# Patient Record
Sex: FEMALE | Race: WHITE | URBAN - METROPOLITAN AREA
[De-identification: names, ages, dates, MRNs, and addresses within clinical notes are randomized per-mention and may not be internally consistent; named-entity substitution may affect disease eponyms.]

---

## 2019-06-18 PROBLEM — E66.09 CLASS 1 OBESITY DUE TO EXCESS CALORIES IN ADULT: Status: ACTIVE | Noted: 2019-06-18

## 2019-06-18 PROBLEM — E66.811 CLASS 1 OBESITY DUE TO EXCESS CALORIES IN ADULT: Status: ACTIVE | Noted: 2019-06-18

## 2019-06-18 PROBLEM — I10 ESSENTIAL HYPERTENSION: Status: ACTIVE | Noted: 2019-06-18

## 2019-06-26 PROBLEM — M70.61 TROCHANTERIC BURSITIS OF BOTH HIPS: Status: ACTIVE | Noted: 2019-06-26

## 2019-06-26 PROBLEM — M70.62 TROCHANTERIC BURSITIS OF BOTH HIPS: Status: ACTIVE | Noted: 2019-06-26

## 2024-03-21 ENCOUNTER — OFFICE VISIT (OUTPATIENT)
Dept: FAMILY MEDICINE CLINIC | Facility: CLINIC | Age: 59
End: 2024-03-21
Payer: COMMERCIAL

## 2024-03-21 ENCOUNTER — PATIENT MESSAGE (OUTPATIENT)
Dept: FAMILY MEDICINE CLINIC | Facility: CLINIC | Age: 59
End: 2024-03-21

## 2024-03-21 VITALS
SYSTOLIC BLOOD PRESSURE: 124 MMHG | RESPIRATION RATE: 18 BRPM | OXYGEN SATURATION: 98 % | DIASTOLIC BLOOD PRESSURE: 78 MMHG | TEMPERATURE: 98 F | HEART RATE: 80 BPM | BODY MASS INDEX: 29 KG/M2 | WEIGHT: 178.63 LBS

## 2024-03-21 DIAGNOSIS — Z13.0 SCREENING FOR ENDOCRINE, METABOLIC, AND IMMUNITY DISORDER: ICD-10-CM

## 2024-03-21 DIAGNOSIS — Z23 NEED FOR VACCINATION: ICD-10-CM

## 2024-03-21 DIAGNOSIS — Z13.228 SCREENING FOR ENDOCRINE, METABOLIC, AND IMMUNITY DISORDER: ICD-10-CM

## 2024-03-21 DIAGNOSIS — I10 BENIGN ESSENTIAL HYPERTENSION: Primary | ICD-10-CM

## 2024-03-21 DIAGNOSIS — J98.01 BRONCHOSPASM: ICD-10-CM

## 2024-03-21 DIAGNOSIS — E78.2 MIXED HYPERLIPIDEMIA: ICD-10-CM

## 2024-03-21 DIAGNOSIS — E11.9 CONTROLLED TYPE 2 DIABETES MELLITUS WITHOUT COMPLICATION, WITHOUT LONG-TERM CURRENT USE OF INSULIN (HCC): ICD-10-CM

## 2024-03-21 DIAGNOSIS — Z13.29 SCREENING FOR ENDOCRINE, METABOLIC, AND IMMUNITY DISORDER: ICD-10-CM

## 2024-03-21 PROCEDURE — 99204 OFFICE O/P NEW MOD 45 MIN: CPT | Performed by: FAMILY MEDICINE

## 2024-03-21 RX ORDER — METFORMIN HYDROCHLORIDE 500 MG/1
500 TABLET, EXTENDED RELEASE ORAL
Qty: 90 TABLET | Refills: 1 | Status: SHIPPED | OUTPATIENT
Start: 2024-03-21

## 2024-03-21 RX ORDER — METFORMIN HYDROCHLORIDE 500 MG/1
500 TABLET, EXTENDED RELEASE ORAL
COMMUNITY
Start: 2024-02-26 | End: 2024-03-21

## 2024-03-21 RX ORDER — LOSARTAN POTASSIUM AND HYDROCHLOROTHIAZIDE 12.5; 5 MG/1; MG/1
1 TABLET ORAL DAILY
Qty: 90 TABLET | Refills: 1 | Status: SHIPPED | OUTPATIENT
Start: 2024-03-21

## 2024-03-21 RX ORDER — ATORVASTATIN CALCIUM 40 MG/1
40 TABLET, FILM COATED ORAL DAILY
Qty: 90 TABLET | Refills: 1 | Status: SHIPPED | OUTPATIENT
Start: 2024-03-21

## 2024-03-21 NOTE — TELEPHONE ENCOUNTER
From: Gala Savage  To: Marcella Deng  Sent: 3/21/2024 9:57 AM CDT  Subject: eye doctor information you request at [facility] today    [address] [address]  [phone]  Dr. Rian Marino

## 2024-03-21 NOTE — PROGRESS NOTES
CHIEF COMPLAINT:   Chief Complaint   Patient presents with    New Patient     Establish care     Blood Pressure         HPI:     Gala Savage is a 58 year old female presents for establishing care.    Gala is a 59 yo F here to establish care.    DM2: was dx'd I . Last A1c was 6.5 in 3/2023.  She feels well- has no polyuria and no polydipsia. Had a diabetic eye exam in 2024, cannot recall name of optometrist in [place]. She takes Metformin  mg daily. Fhx: mother, father, sisters and brother with DM2.    HTN: She has been treated for HTN since .  On Losartan-hydrochlorothiazide tolerating well. Has no HA, no palpitations, no leg swelling, no CP.     HL: Is on Atorvastatin, tolerating well without any muscle aches or pains. 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 Has a pretty healthy diet.    Bronchospasm: she uses an albuteorl inhaler when she is exposed to fumes from carpet/vianney or when she is sick. She feels chest tightness and has wheezing. No h/o asthma.              HISTORY:  Past Medical History:   Diagnosis Date    Migraines       Past Surgical History:   Procedure Laterality Date     DELIVERY ONLY          CHOLECYSTECTOMY          COLONOSCOPY N/A 08/10/2019    Procedure: COLONOSCOPY, POSSIBLE BIOPSY, POSSIBLE POLYPECTOMY 42180;  Surgeon: Jere Raman MD;  [facility]    NEEDLE BIOPSY RIGHT      right breast (benign)      Family History   Problem Relation Age of Onset    Diabetes Mother     Hypertension Mother     Hypertension Father     Diabetes Father     Heart Disease Father     Hypertension Sister     Diabetes Sister     Hypertension Sister     Diabetes Sister     Hypertension Brother     Diabetes Brother     Breast Cancer Neg     Colon Cancer Neg     Ovarian Cancer Neg       Social History:   Social History     Socioeconomic History    Marital status:    Tobacco Use    Smoking status: Never    Smokeless  tobacco: Never   Vaping Use    Vaping Use: Never used   Substance and Sexual Activity    Alcohol use: No    Drug use: No   Social History Narrative    Works in reception/customer service, but in the office.        Medications (Active prior to today's visit):  Current Outpatient Medications   Medication Sig Dispense Refill    losartan-hydroCHLOROthiazide 50-12.5 MG Oral Tab Take 1 tablet by mouth daily. 90 tablet 1    atorvastatin 40 MG Oral Tab Take 1 tablet (40 mg total) by mouth daily. 90 tablet 1    metFORMIN  MG Oral Tablet 24 Hr Take 1 tablet (500 mg total) by mouth daily with breakfast. 90 tablet 1    albuterol 108 (90 Base) MCG/ACT Inhalation Aero Soln Inhale 2 puffs into the lungs every 4 (four) hours as needed for Wheezing. 18 g 0    fluticasone propionate 50 MCG/ACT Nasal Suspension 2 sprays by Nasal route every evening. 16 g 11    aspirin 81 MG Oral Tab EC Take 1 tablet (81 mg total) by mouth daily. 90 tablet 0       Allergies:  Allergies   Allergen Reactions    Latex RASH       PSFH elements reviewed from today and agreed except as otherwise stated in HPI.  ROS:     Review of Systems   Respiratory:  Negative for cough, shortness of breath and wheezing.    Cardiovascular:  Negative for chest pain, palpitations and leg swelling.   Gastrointestinal:  Negative for abdominal pain, diarrhea, nausea and vomiting.   Endocrine: Negative for polydipsia and polyuria.   Neurological:  Negative for dizziness and headaches.         Pertinent positives and negatives noted in the the HPI.    PHYSICAL EXAM:   /78 (BP Location: Left arm, Patient Position: Sitting, Cuff Size: large)   Pulse 80   Temp 97.7 °F (36.5 °C) (Temporal)   Resp 18   Wt 178 lb 9.6 oz (81 kg)   LMP  (LMP Unknown)   SpO2 98%   BMI 28.83 kg/m²   Vital signs reviewed.Appears stated age, well groomed.  Physical Exam  Vitals reviewed.   Constitutional:       Appearance: Normal appearance.   HENT:      Head: Normocephalic.    Cardiovascular:      Rate and Rhythm: Normal rate and regular rhythm.      Pulses: Normal pulses.      Heart sounds: Normal heart sounds.   Pulmonary:      Effort: Pulmonary effort is normal.      Breath sounds: Normal breath sounds.   Abdominal:      General: Bowel sounds are normal. There is no distension.      Palpations: Abdomen is soft.      Tenderness: There is no abdominal tenderness. There is no guarding.      Comments: No HSM   Musculoskeletal:      Right lower leg: No edema.      Left lower leg: No edema.   Skin:     General: Skin is warm and dry.   Neurological:      General: No focal deficit present.      Mental Status: She is alert and oriented to person, place, and time.      Comments: Bilateral barefoot skin diabetic exam is normal, visualized feet and the appearance is normal.  Bilateral monofilament/sensation of both feet is normal.  Pulsation pedal pulse exam of both lower legs/feet is normal as well.        Psychiatric:         Behavior: Behavior normal.          LABS     No visits with results within 2 Month(s) from this visit.   Latest known visit with results is:   [facility] Lab Encounter on 02/26/2022   Component Date Value    Patient Fasting? 02/26/2022 Yes     Sodium 02/26/2022 141     Potassium 02/26/2022 3.90     Chloride 02/26/2022 103     Carbon Dioxide 02/26/2022 25.8     Blood Urea Nitrogen 02/26/2022 18.0     Creatinine 02/26/2022 0.63     BUN/CREAT Ratio 02/26/2022 29.0 (H)     Glucose 02/26/2022 142 (H)     Calcium 02/26/2022 9.4     GFR CKD-EPI 02/26/2022 101.30       REVIEWED THIS VISIT  ASSESSMENT/PLAN:   58 year old female with    1. Benign essential hypertension  - BP stable today  - due to check labs  - cont Losartan-hydrochlorothiazide  - d/w pt a healthy, low-sodium diet, regular exercise, and wt loss  - RTC in 6 months for f-u    - COMP METABOLIC PANEL [14583] [Q]  - LIPID PANEL [7600] [Q]  - TSH W REFLEX TO FREE T4 [64133][Q]  - CBC [5399] [Q]  - losartan-hydroCHLOROthiazide  50-12.5 MG Oral Tab; Take 1 tablet by mouth daily.  Dispense: 90 tablet; Refill: 1    2. Mixed hyperlipidemia  - due to check lipids and LFTs  - cont Atorvastatin daily  - d/w pt a healthy, low-fat/low-cholesterol diet, regular exercise, and wt loss  - RTC in 6 months for f-u    - COMP METABOLIC PANEL [23115] [Q]  - LIPID PANEL [7600] [Q]  - TSH W REFLEX TO FREE T4 [52226][Q]  - atorvastatin 40 MG Oral Tab; Take 1 tablet (40 mg total) by mouth daily.  Dispense: 90 tablet; Refill: 1    3. Controlled type 2 diabetes mellitus without complication, without long-term current use of insulin (HCC)  - diabetes is well controlled  - last A1c in 3/2023 : 6.5  - last microalbumin: overdue, ordered  - last diabetic foot exam: compelted today 3/21/24  - last diabetic eye exam: completed 1/2024, pt cannot recall optometrist name (signed relase form, will call back with name)  - cont meds: Metofrmin  mg QD  - discussed appropriate diabetic diet, regular exercise, and wt loss  - RTC in 3 months for f-u     - HGB A1C [496] [Q]  - COMP METABOLIC PANEL [14914] [Q]  - LIPID PANEL [7600] [Q]  - MICROALB/CREAT RATIO, RANDOM URINE [4517] [Q]  - metFORMIN  MG Oral Tablet 24 Hr; Take 1 tablet (500 mg total) by mouth daily with breakfast.  Dispense: 90 tablet; Refill: 1    4. Bronchospasm  - uses Albutrerol prn triggers     5. Need for vaccination    - INFLUENZA REFUSED [facility]    6. Screening for endocrine, metabolic, and immunity disorder    - TSH W REFLEX TO FREE T4 [06078][Q]  - CBC [6399] [Q]      Meds This Visit:  Requested Prescriptions     Signed Prescriptions Disp Refills    losartan-hydroCHLOROthiazide 50-12.5 MG Oral Tab 90 tablet 1     Sig: Take 1 tablet by mouth daily.    atorvastatin 40 MG Oral Tab 90 tablet 1     Sig: Take 1 tablet (40 mg total) by mouth daily.    metFORMIN  MG Oral Tablet 24 Hr 90 tablet 1     Sig: Take 1 tablet (500 mg total) by mouth daily with breakfast.       Health Maintenance:  Health  Maintenance   Topic Date Due    Diabetes Care Foot Exam  Never done    Diabetes Care Dilated Eye Exam  Never done    Diabetes Care: Microalb/Creat Ratio  Never done    Annual Physical  Never done    Zoster Vaccines (1 of 2) Never done    Diabetes Care A1C  07/20/2022    Diabetes Care: GFR  02/26/2023    COVID-19 Vaccine (5 - 2023-24 season) 09/01/2023    Influenza Vaccine (1) 06/30/2024 (Originally 10/1/2023)    Mammogram  11/08/2024    Pap Smear  10/15/2025    DTaP,Tdap,and Td Vaccines (2 - Td or Tdap) 04/04/2027    Colorectal Cancer Screening  08/10/2029    Annual Depression Screening  Completed    Pneumococcal Vaccine: Birth to 64yrs  Completed         Patient/Caregiver Education: There are no barriers to learning. Medical education done.   Outcome: Patient verbalizes understanding and agrees with plan. Advised to call or RTC if symptoms persist or worsen.    Problem List:     Patient Active Problem List   Diagnosis    Benign essential hypertension    Class 1 obesity due to excess calories in adult    Trochanteric bursitis of both hips    Controlled type 2 diabetes mellitus without complication, without long-term current use of insulin (HCC)    Mixed hyperlipidemia       Imaging & Referrals:  INFLUENZA REFUSED [facility]     3/21/2024  Marcella Deng MD      Patient understands plan and follow-up.  Return in about 3 months (around 6/21/2024) for annual physical overdue, Diabetes, HTN, Cholesterol.

## 2024-03-27 NOTE — TELEPHONE ENCOUNTER
Recorded of release was faxed over to Dr. Rian caldera for a DM eye exam. Phone number [phone] fax number [phone].

## 2024-03-29 DIAGNOSIS — E11.9 CONTROLLED TYPE 2 DIABETES MELLITUS WITHOUT COMPLICATION, WITHOUT LONG-TERM CURRENT USE OF INSULIN (HCC): ICD-10-CM

## 2024-03-29 DIAGNOSIS — I10 BENIGN ESSENTIAL HYPERTENSION: Primary | ICD-10-CM

## 2024-03-29 DIAGNOSIS — E78.2 MIXED HYPERLIPIDEMIA: ICD-10-CM

## 2024-03-29 LAB
ABSOLUTE BASOPHILS: 57 CELLS/UL (ref 0–200)
ABSOLUTE EOSINOPHILS: 381 CELLS/UL (ref 15–500)
ABSOLUTE LYMPHOCYTES: 3272 CELLS/UL (ref 850–3900)
ABSOLUTE MONOCYTES: 486 CELLS/UL (ref 200–950)
ABSOLUTE NEUTROPHILS: 3904 CELLS/UL (ref 1500–7800)
ALBUMIN/GLOBULIN RATIO: 1.8 (CALC) (ref 1–2.5)
ALBUMIN: 4.6 G/DL (ref 3.6–5.1)
ALKALINE PHOSPHATASE: 88 U/L (ref 37–153)
ALT: 15 U/L (ref 6–29)
AST: 12 U/L (ref 10–35)
BASOPHILS: 0.7 %
BILIRUBIN, TOTAL: 0.4 MG/DL (ref 0.2–1.2)
BUN: 18 MG/DL (ref 7–25)
CALCIUM: 9.6 MG/DL (ref 8.6–10.4)
CARBON DIOXIDE: 29 MMOL/L (ref 20–32)
CHLORIDE: 104 MMOL/L (ref 98–110)
CHOL/HDLC RATIO: 2.4 (CALC)
CHOLESTEROL, TOTAL: 109 MG/DL
CREATININE, RANDOM URINE: 349 MG/DL (ref 20–275)
CREATININE: 0.68 MG/DL (ref 0.5–1.03)
EGFR: 102 ML/MIN/1.73M2
EOSINOPHILS: 4.7 %
GLOBULIN: 2.6 G/DL (CALC) (ref 1.9–3.7)
GLUCOSE: 138 MG/DL (ref 65–99)
HDL CHOLESTEROL: 46 MG/DL
HEMATOCRIT: 41.2 % (ref 35–45)
HEMOGLOBIN A1C: 6.8 % OF TOTAL HGB
HEMOGLOBIN: 13.5 G/DL (ref 11.7–15.5)
LDL-CHOLESTEROL: 48 MG/DL (CALC)
LYMPHOCYTES: 40.4 %
MCH: 30.8 PG (ref 27–33)
MCHC: 32.8 G/DL (ref 32–36)
MCV: 94.1 FL (ref 80–100)
MICROALBUMIN/CREATININE RATIO, RANDOM URINE: 9 MG/G CREAT
MICROALBUMIN: 3 MG/DL
MONOCYTES: 6 %
MPV: 10.9 FL (ref 7.5–12.5)
NEUTROPHILS: 48.2 %
NON-HDL CHOLESTEROL: 63 MG/DL (CALC)
PLATELET COUNT: 266 THOUSAND/UL (ref 140–400)
POTASSIUM: 3.8 MMOL/L (ref 3.5–5.3)
PROTEIN, TOTAL: 7.2 G/DL (ref 6.1–8.1)
RDW: 12.3 % (ref 11–15)
RED BLOOD CELL COUNT: 4.38 MILLION/UL (ref 3.8–5.1)
SODIUM: 142 MMOL/L (ref 135–146)
TRIGLYCERIDES: 71 MG/DL
TSH W/REFLEX TO FT4: 1.55 MIU/L (ref 0.4–4.5)
WHITE BLOOD CELL COUNT: 8.1 THOUSAND/UL (ref 3.8–10.8)

## 2024-08-16 DIAGNOSIS — I10 BENIGN ESSENTIAL HYPERTENSION: ICD-10-CM

## 2024-08-16 RX ORDER — LOSARTAN POTASSIUM AND HYDROCHLOROTHIAZIDE 12.5; 5 MG/1; MG/1
1 TABLET ORAL DAILY
Qty: 90 TABLET | Refills: 0 | Status: SHIPPED | OUTPATIENT
Start: 2024-08-16

## 2024-08-16 NOTE — TELEPHONE ENCOUNTER
Refill Passed Protocol:     Pt requesting refill of   Requested Prescriptions     Pending Prescriptions Disp Refills    losartan-hydroCHLOROthiazide 50-12.5 MG Oral Tab [Pharmacy Med Name: LOSARTAN/HCTZ 50/12.5MG TABLETS] 90 tablet 0     Sig: TAKE 1 TABLET BY MOUTH DAILY      Refill was approved and sent to pharmacy:     Hypertension Medications Protocol [id number]/16/2024 08:02 AM   Protocol Details CMP or BMP in past 12 months    Last BP reading less than 140/90    In person appointment or virtual visit in the past 12 mos or appointment in next 3 mos    EGFRCR or GFRNAA > 50        Last Time Medication was Filled:  3/21/2024 90 days 1 refill    Last Office Visit with Provider: 3/21/2024    Appt. scheduled on 8/22/2024

## 2024-08-22 ENCOUNTER — OFFICE VISIT (OUTPATIENT)
Dept: FAMILY MEDICINE CLINIC | Facility: CLINIC | Age: 59
End: 2024-08-22
Payer: COMMERCIAL

## 2024-08-22 VITALS
BODY MASS INDEX: 28.55 KG/M2 | OXYGEN SATURATION: 99 % | DIASTOLIC BLOOD PRESSURE: 84 MMHG | HEIGHT: 66.18 IN | HEART RATE: 78 BPM | RESPIRATION RATE: 18 BRPM | TEMPERATURE: 98 F | SYSTOLIC BLOOD PRESSURE: 132 MMHG | WEIGHT: 177.63 LBS

## 2024-08-22 DIAGNOSIS — Z12.31 ENCOUNTER FOR SCREENING MAMMOGRAM FOR MALIGNANT NEOPLASM OF BREAST: ICD-10-CM

## 2024-08-22 DIAGNOSIS — Z13.228 SCREENING FOR ENDOCRINE, NUTRITIONAL, METABOLIC AND IMMUNITY DISORDER: ICD-10-CM

## 2024-08-22 DIAGNOSIS — I10 BENIGN ESSENTIAL HYPERTENSION: ICD-10-CM

## 2024-08-22 DIAGNOSIS — Z13.0 SCREENING FOR ENDOCRINE, NUTRITIONAL, METABOLIC AND IMMUNITY DISORDER: ICD-10-CM

## 2024-08-22 DIAGNOSIS — E78.2 MIXED HYPERLIPIDEMIA: ICD-10-CM

## 2024-08-22 DIAGNOSIS — Z13.29 SCREENING FOR ENDOCRINE, NUTRITIONAL, METABOLIC AND IMMUNITY DISORDER: ICD-10-CM

## 2024-08-22 DIAGNOSIS — E11.9 CONTROLLED TYPE 2 DIABETES MELLITUS WITHOUT COMPLICATION, WITHOUT LONG-TERM CURRENT USE OF INSULIN (HCC): ICD-10-CM

## 2024-08-22 DIAGNOSIS — Z13.21 SCREENING FOR ENDOCRINE, NUTRITIONAL, METABOLIC AND IMMUNITY DISORDER: ICD-10-CM

## 2024-08-22 DIAGNOSIS — Z00.00 ANNUAL PHYSICAL EXAM: Primary | ICD-10-CM

## 2024-08-22 PROBLEM — G43.109 MIGRAINE WITH AURA: Status: ACTIVE | Noted: 2024-08-22

## 2024-08-22 PROBLEM — N39.0 ACUTE URINARY TRACT INFECTION: Status: ACTIVE | Noted: 2024-08-22
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
## 2024-08-22 PROBLEM — J01.00 ACUTE MAXILLARY SINUSITIS: Status: ACTIVE | Noted: 2024-08-22

## 2024-08-22 PROBLEM — K80.20 GALLSTONE: Status: ACTIVE | Noted: 2024-08-22

## 2024-08-22 PROBLEM — E66.9 OBESITY WITH BODY MASS INDEX 30 OR GREATER: Status: ACTIVE | Noted: 2024-08-22

## 2024-08-22 PROBLEM — H66.90 OTITIS MEDIA: Status: ACTIVE | Noted: 2024-08-22

## 2024-08-22 PROBLEM — K76.0 STEATOSIS OF LIVER: Status: ACTIVE | Noted: 2018-04-27

## 2024-08-22 PROBLEM — J06.9 UPPER RESPIRATORY INFECTION: Status: ACTIVE | Noted: 2024-08-22

## 2024-08-22 PROCEDURE — 99396 PREV VISIT EST AGE 40-64: CPT | Performed by: FAMILY MEDICINE

## 2024-08-22 RX ORDER — BENZONATATE 200 MG/1
200 CAPSULE ORAL 3 TIMES DAILY PRN
COMMUNITY
Start: 2023-10-07 | End: 2024-08-22 | Stop reason: ALTCHOICE

## 2024-08-24 NOTE — PROGRESS NOTES
Chief Complaint   Patient presents with    Physical     Annual exam w/o pap        HPI:   Gala Savage is a 59 year old female who presents for a complete physical with gyne exam.   Patient feels well, dental visit up to date, no hearing problem.  Vaccinations : will take Shingles vaccine later    LMP: postmenopausal  Sexual activity:monogamy   Contraception: postmenopausal  Exercise: walking.  Diet:  eating/snacks at night    Wt Readings from Last 3 Encounters:   08/22/24 177 lb 9.6 oz (80.6 kg)   03/21/24 178 lb 9.6 oz (81 kg)   02/09/22 184 lb 12.8 oz (83.8 kg)      BP Readings from Last 3 Encounters:   08/22/24 132/84   03/21/24 124/78   02/09/22 157/88     No LMP recorded (lmp unknown). Patient is postmenopausal.     Annual physical:  Overall pt states she feels well.     LMP: postmenopausal  Sexually Active: monogamous  Last pap smear: 10/2020, normal pap and HPV neg (rpt in 5 yrs)  Last mammogram: 11/2023, normal  Last Colon Ca screening: colonoscopy 8/2019 (rpt in 10 yrs)  Last DEXA Scan: n/a    Exercise: walking  Diet: regular, eating/snacking at night    DM2: was dx'd in 2020. Last A1c was 6.5 in 3/2023- is due to recheck labs.  She feels well- has no polyuria and no polydipsia. Had a diabetic eye exam in 1/2024, Dr. Marino at the [facility]. She takes Metformin  mg daily. Fhx: mother, father, sisters and brother with DM2.     HTN: She has been treated for HTN since 2020.  On Losartan-hydrochlorothiazide tolerating well. Has no HA, no palpitations, no leg swelling, no CP.      HL: Is on Atorvastatin, tolerating well without any muscle aches or pains. 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 Has a pretty healthy diet.             Current Outpatient Medications   Medication Sig Dispense Refill    losartan-hydroCHLOROthiazide 50-12.5 MG Oral Tab TAKE 1 TABLET BY MOUTH DAILY 90 tablet 0    atorvastatin 40 MG Oral Tab Take 1 tablet (40 mg total) by mouth daily. 90 tablet 1     metFORMIN  MG Oral Tablet 24 Hr Take 1 tablet (500 mg total) by mouth daily with breakfast. 90 tablet 1    albuterol 108 (90 Base) MCG/ACT Inhalation Aero Soln Inhale 2 puffs into the lungs every 4 (four) hours as needed for Wheezing. 18 g 0    fluticasone propionate 50 MCG/ACT Nasal Suspension 2 sprays by Nasal route every evening. 16 g 11    aspirin 81 MG Oral Tab EC Take 1 tablet (81 mg total) by mouth daily. 90 tablet 0      Past Medical History:    Migraines      Past Surgical History:   Procedure Laterality Date     delivery only          Cholecystectomy          Colonoscopy N/A 08/10/2019    Procedure: COLONOSCOPY, POSSIBLE BIOPSY, POSSIBLE POLYPECTOMY 53757;  Surgeon: Jere Raman MD;  [facility]    Needle biopsy right  2017    right breast (benign)      Family History   Problem Relation Age of Onset    Diabetes Mother     Hypertension Mother     Hypertension Father     Diabetes Father     Heart Disease Father     Hypertension Sister     Diabetes Sister     Hypertension Sister     Diabetes Sister     Hypertension Brother     Diabetes Brother     Breast Cancer Neg     Colon Cancer Neg     Ovarian Cancer Neg       Social History:  Social History     Socioeconomic History    Marital status:    Tobacco Use    Smoking status: Never    Smokeless tobacco: Never   Vaping Use    Vaping status: Never Used   Substance and Sexual Activity    Alcohol use: No    Drug use: No   Social History Narrative    Works in reception/customer service, but in the office.       Allergies:  Allergies   Allergen Reactions    Latex RASH        REVIEW OF SYSTEMS:     Review of Systems   Constitutional:  Negative for appetite change and fatigue.   Cardiovascular:  Negative for chest pain, palpitations and leg swelling.   Gastrointestinal:  Negative for abdominal pain, constipation, diarrhea, nausea and vomiting.   Endocrine: Negative for polydipsia and polyuria.   Genitourinary:   Negative for dysuria.        EXAM:   /84 (BP Location: Left arm, Patient Position: Sitting, Cuff Size: adult)   Pulse 78   Temp 97.8 °F (36.6 °C) (Temporal)   Resp 18   Ht 5' 6.18\" (1.681 m)   Wt 177 lb 9.6 oz (80.6 kg)   LMP  (LMP Unknown)   SpO2 99%   BMI 28.51 kg/m²    GENERAL: WD/WN in no acute distress.   HEENT: PERRLA and EOMI.  OP moist no lesions.TM WNL, yoselin.Normal ears canals bilaterally.  Neck is supple, with no cervical LAD or thyroid abnormalities.  LUNGS: are clear to auscultation bilaterally, with no wheeze, rhonchi, or rales.   HEART: is RRR.  S1, S2, with no murmurs,clicks, gallops  BREAST:deferred  ABDOMEN: is soft,NBS, NT/ND with no HSM.  No rebound or guarding. No CVA tenderness, no hernias.   EXAM: deferred  RECTAL EXAM: deferred  NEURO: Cranial nerves II-XII normal,no focal abnormalities, and reflexes coordination and gait normal and symmetric.Sensation intact.  EXTREMETIES: are symmetric with no cyanosis, clubbing, or edema.  MS: Normal muscles tones, no joints abnormalities.  SKIN: Normal color, turgor, no lesions, rashes or wounds.  PSYCH: normal affect and mood.    ASSESSMENT AND PLAN:     59 year old female with     1. Annual physical exam  Routine labs ordered today, await results. Counseled pt on healthy lifestyle changes. Vaccines today: postpones Shingles . Contraception: post-menopausal .    Last pap smear: 10/2020, normal pap and HPV neg (rpt in 5 yrs)  Last mammogram: 11/2023, normal  Last Colon Ca screening: colonoscopy 8/2019 (rpt in 10 yrs)  Last DEXA Scan: n/a    - CBC With Differential With Platelet  - Comp Metabolic Panel  - Lipid Panel  - TSH W Reflex To Free T4    2. Benign essential hypertension  - BP stable today  - due to check labs  - cont Losartan-hydrochlorothiazide  - d/w pt a healthy, low-sodium diet, regular exercise, and wt loss  - RTC in 6 months for f-u    - Comp Metabolic Panel  - Lipid Panel    3. Mixed hyperlipidemia  - due to check lipids and  LFTs  - cont Atorvastatin daily  - d/w pt a healthy, low-fat/low-cholesterol diet, regular exercise, and wt loss  - RTC in 6 months for f-u    - Comp Metabolic Panel  - Lipid Panel    4. Controlled type 2 diabetes mellitus without complication, without long-term current use of insulin (HCC)  - diabetes is well controlled  - last A1c in 3/2024 : 6.8  - last microalbumin: 3/2024  - last diabetic foot exam: completed 3/21/24  - last diabetic eye exam: completed 1/2024, Dr. Marino at [facility] (no DR)  - cont meds: Metofrmin  mg QD  - discussed appropriate diabetic diet, regular exercise, and wt loss  - RTC in 6 months for f-u    - Comp Metabolic Panel  - Lipid Panel  - HGB A1C [496] [Q]    5. Screening for endocrine, nutritional, metabolic and immunity disorder    - CBC With Differential With Platelet  - Comp Metabolic Panel  - TSH W Reflex To Free T4    6. Encounter for screening mammogram for malignant neoplasm of breast    - [facility] EVELIN 2D+3D SCREENING BILAT (CPT=77067/72790); Future        Pt's was recommended low fat diet and aerobic exercise 30 minutes three times weekly.   Health maintenance.   Osteoporosis prevention addressed  Recommended whole food type diet, eliminate processed food/low sugar and low sat fat diet      The patient indicates understanding of these issues and agrees to the plan.    Return in about 6 months (around 2/22/2025) for Diabetes, HTN, Cholesterol, medication follow-up.

## 2024-09-20 DIAGNOSIS — E11.9 CONTROLLED TYPE 2 DIABETES MELLITUS WITHOUT COMPLICATION, WITHOUT LONG-TERM CURRENT USE OF INSULIN (HCC): ICD-10-CM

## 2024-09-20 RX ORDER — METFORMIN HCL 500 MG
500 TABLET, EXTENDED RELEASE 24 HR ORAL
Qty: 90 TABLET | Refills: 0 | Status: SHIPPED | OUTPATIENT
Start: 2024-09-20

## 2024-09-20 NOTE — TELEPHONE ENCOUNTER
Pt requesting refill of   Requested Prescriptions     Pending Prescriptions Disp Refills    METFORMIN  MG Oral Tablet 24 Hr [Pharmacy Med Name: METFORMIN ER 500MG 24HR TABS] 90 tablet 1     Sig: TAKE 1 TABLET(500 MG) BY MOUTH DAILY WITH BREAKFAST      Last Time Medication was Filled:  3/21/2024    Last Office Visit with Provider: 8/22/2024  Controlled type 2 diabetes mellitus without complication, without long-term current use of insulin (HCC)  - diabetes is well controlled  - last A1c in 3/2024 : 6.8  - last microalbumin: 3/2024  - last diabetic foot exam: completed 3/21/24  - last diabetic eye exam: completed 1/2024, Dr. Marino at [facility] (no DR)  - cont meds: Metofrmin  mg QD  - discussed appropriate diabetic diet, regular exercise, and wt loss  - RTC in 6 months for f-u    No future appointments.   Return in about 6 months (around 2/22/2025) for Diabetes, HTN, Cholesterol, medication follow-up.

## 2024-09-25 LAB
ABSOLUTE BASOPHILS: 57 CELLS/UL (ref 0–200)
ABSOLUTE EOSINOPHILS: 291 CELLS/UL (ref 15–500)
ABSOLUTE LYMPHOCYTES: 2705 CELLS/UL (ref 850–3900)
ABSOLUTE MONOCYTES: 419 CELLS/UL (ref 200–950)
ABSOLUTE NEUTROPHILS: 3628 CELLS/UL (ref 1500–7800)
ALBUMIN/GLOBULIN RATIO: 1.7 (CALC) (ref 1–2.5)
ALBUMIN: 4.5 G/DL (ref 3.6–5.1)
ALKALINE PHOSPHATASE: 93 U/L (ref 37–153)
ALT: 16 U/L (ref 6–29)
AST: 12 U/L (ref 10–35)
BASOPHILS: 0.8 %
BILIRUBIN, TOTAL: 0.7 MG/DL (ref 0.2–1.2)
BUN: 21 MG/DL (ref 7–25)
CALCIUM: 9.5 MG/DL (ref 8.6–10.4)
CARBON DIOXIDE: 26 MMOL/L (ref 20–32)
CHLORIDE: 105 MMOL/L (ref 98–110)
CHOL/HDLC RATIO: 2.7 (CALC)
CHOLESTEROL, TOTAL: 103 MG/DL
CREATININE: 0.71 MG/DL (ref 0.5–1.03)
EGFR: 98 ML/MIN/1.73M2
EOSINOPHILS: 4.1 %
GLOBULIN: 2.7 G/DL (CALC) (ref 1.9–3.7)
GLUCOSE: 125 MG/DL (ref 65–99)
HDL CHOLESTEROL: 38 MG/DL
HEMATOCRIT: 42.2 % (ref 35–45)
HEMOGLOBIN A1C: 6.9 % OF TOTAL HGB
HEMOGLOBIN: 13.7 G/DL (ref 11.7–15.5)
LDL-CHOLESTEROL: 45 MG/DL (CALC)
LYMPHOCYTES: 38.1 %
MCH: 31.4 PG (ref 27–33)
MCHC: 32.5 G/DL (ref 32–36)
MCV: 96.6 FL (ref 80–100)
MONOCYTES: 5.9 %
MPV: 10.4 FL (ref 7.5–12.5)
NEUTROPHILS: 51.1 %
NON-HDL CHOLESTEROL: 65 MG/DL (CALC)
PLATELET COUNT: 273 THOUSAND/UL (ref 140–400)
POTASSIUM: 4.3 MMOL/L (ref 3.5–5.3)
PROTEIN, TOTAL: 7.2 G/DL (ref 6.1–8.1)
RDW: 12.7 % (ref 11–15)
RED BLOOD CELL COUNT: 4.37 MILLION/UL (ref 3.8–5.1)
SODIUM: 142 MMOL/L (ref 135–146)
TRIGLYCERIDES: 118 MG/DL
TSH W/REFLEX TO FT4: 1.76 MIU/L (ref 0.4–4.5)
WHITE BLOOD CELL COUNT: 7.1 THOUSAND/UL (ref 3.8–10.8)

## 2024-09-27 DIAGNOSIS — E78.2 MIXED HYPERLIPIDEMIA: ICD-10-CM

## 2024-09-27 DIAGNOSIS — I10 BENIGN ESSENTIAL HYPERTENSION: ICD-10-CM

## 2024-09-27 DIAGNOSIS — E11.9 CONTROLLED TYPE 2 DIABETES MELLITUS WITHOUT COMPLICATION, WITHOUT LONG-TERM CURRENT USE OF INSULIN (HCC): Primary | ICD-10-CM

## 2024-10-01 PROBLEM — J06.9 UPPER RESPIRATORY INFECTION: Status: RESOLVED | Noted: 2024-08-22 | Resolved: 2024-10-01

## 2024-10-17 ENCOUNTER — OFFICE VISIT (OUTPATIENT)
Dept: FAMILY MEDICINE CLINIC | Facility: CLINIC | Age: 59
End: 2024-10-17
Payer: COMMERCIAL

## 2024-10-17 VITALS
HEART RATE: 80 BPM | SYSTOLIC BLOOD PRESSURE: 122 MMHG | RESPIRATION RATE: 18 BRPM | OXYGEN SATURATION: 97 % | DIASTOLIC BLOOD PRESSURE: 82 MMHG | BODY MASS INDEX: 29 KG/M2 | WEIGHT: 178 LBS | TEMPERATURE: 97 F

## 2024-10-17 DIAGNOSIS — R10.30 LOWER ABDOMINAL PAIN: Primary | ICD-10-CM

## 2024-10-17 DIAGNOSIS — Z23 NEED FOR VACCINATION: ICD-10-CM

## 2024-10-17 PROBLEM — J01.00 ACUTE MAXILLARY SINUSITIS: Status: RESOLVED | Noted: 2024-08-22 | Resolved: 2024-10-17

## 2024-10-17 PROBLEM — H66.90 OTITIS MEDIA: Status: RESOLVED | Noted: 2024-08-22 | Resolved: 2024-10-17

## 2024-10-17 PROBLEM — K80.20 GALLSTONE: Status: RESOLVED | Noted: 2024-08-22 | Resolved: 2024-10-17
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
## 2024-10-17 PROBLEM — N39.0 ACUTE URINARY TRACT INFECTION: Status: RESOLVED | Noted: 2024-08-22 | Resolved: 2024-10-17

## 2024-10-17 LAB
APPEARANCE: CLEAR
BILIRUBIN: NEGATIVE
GLUCOSE (URINE DIPSTICK): NEGATIVE MG/DL
KETONES (URINE DIPSTICK): NEGATIVE MG/DL
LEUKOCYTES: NEGATIVE
MULTISTIX LOT#: NORMAL NUMERIC
NITRITE, URINE: NEGATIVE
OCCULT BLOOD: NEGATIVE
PH, URINE: 5.5 (ref 4.5–8)
SPECIFIC GRAVITY: >1.03 (ref 1–1.03)
UROBILINOGEN,SEMI-QN: 0.2 MG/DL (ref 0–1.9)

## 2024-10-17 PROCEDURE — 99214 OFFICE O/P EST MOD 30 MIN: CPT | Performed by: FAMILY MEDICINE

## 2024-10-17 PROCEDURE — 81003 URINALYSIS AUTO W/O SCOPE: CPT | Performed by: FAMILY MEDICINE

## 2024-10-17 NOTE — PROGRESS NOTES
CHIEF COMPLAINT:   Chief Complaint   Patient presents with    Abdominal Pain     Bloating/ pain / blood in stool          HPI:     Gala Savage is a 59 year old female presents for abdominal pain.    Abdominal pain:  pt has a 1-1.5 wk history of lower abdominal pain.  She states her lower abdomen felt \"hard\" when she pushed on it and felt a \"vibration\" and \"movement\" inside.  She would strain to have a BM.  When this happened in the past, she would drink water and eat a soft food diet and she would get better. She did the same this time and her abdomen is improved, is now soft again.  She has a h/o presumed diverticulitis per her previous PCP in [place] (never had imaging).  She reports eating popcorn recently.  She is s/p cholecystectomy.  She has no sx of dysuria, no urinary frequency, or pain with urination. No F/C and has some nausea, but not vomiting.               HISTORY:  Past Medical History:    Migraines      Past Surgical History:   Procedure Laterality Date     delivery only          Cholecystectomy          Colonoscopy N/A 08/10/2019    Procedure: COLONOSCOPY, POSSIBLE BIOPSY, POSSIBLE POLYPECTOMY 79716;  Surgeon: Jere Raman MD;  [place] [facility]    Needle biopsy right  2017    right breast (benign)      Family History   Problem Relation Age of Onset    Diabetes Mother     Hypertension Mother     Hypertension Father     Diabetes Father     Heart Disease Father     Hypertension Sister     Diabetes Sister     Hypertension Sister     Diabetes Sister     Hypertension Brother     Diabetes Brother     Breast Cancer Neg     Colon Cancer Neg     Ovarian Cancer Neg       Social History:   Social History     Socioeconomic History    Marital status:    Tobacco Use    Smoking status: Never    Smokeless tobacco: Never   Vaping Use    Vaping status: Never Used   Substance and Sexual Activity    Alcohol use: No    Drug use: No   Social History Narrative    Works in  reception/customer service, but in the office.        Medications (Active prior to today's visit):  Current Outpatient Medications   Medication Sig Dispense Refill    metFORMIN  MG Oral Tablet 24 Hr TAKE 1 TABLET(500 MG) BY MOUTH DAILY WITH BREAKFAST 90 tablet 0    losartan-hydroCHLOROthiazide 50-12.5 MG Oral Tab TAKE 1 TABLET BY MOUTH DAILY 90 tablet 0    atorvastatin 40 MG Oral Tab Take 1 tablet (40 mg total) by mouth daily. 90 tablet 1    albuterol 108 (90 Base) MCG/ACT Inhalation Aero Soln Inhale 2 puffs into the lungs every 4 (four) hours as needed for Wheezing. 18 g 0    fluticasone propionate 50 MCG/ACT Nasal Suspension 2 sprays by Nasal route every evening. 16 g 11    aspirin 81 MG Oral Tab EC Take 1 tablet (81 mg total) by mouth daily. 90 tablet 0       Allergies:  Allergies[1]    PSFH elements reviewed from today and agreed except as otherwise stated in HPI.  ROS:     Review of Systems   Constitutional:  Negative for appetite change, chills, fatigue, fever and unexpected weight change.   Gastrointestinal:  Positive for abdominal pain, constipation and nausea. Negative for abdominal distention, diarrhea and vomiting.   Genitourinary:  Negative for difficulty urinating, dysuria, flank pain, frequency and hematuria.         Pertinent positives and negatives noted in the the HPI.    PHYSICAL EXAM:   /82 (BP Location: Left arm, Patient Position: Sitting, Cuff Size: adult)   Pulse 80   Temp 97.3 °F (36.3 °C) (Temporal)   Resp 18   Wt 178 lb (80.7 kg)   LMP  (LMP Unknown)   SpO2 97%   BMI 28.57 kg/m²   Vital signs reviewed.Appears stated age, well groomed.  Physical Exam  Vitals reviewed.   Constitutional:       Appearance: Normal appearance.   HENT:      Head: Normocephalic.   Cardiovascular:      Rate and Rhythm: Normal rate and regular rhythm.      Pulses: Normal pulses.      Heart sounds: Normal heart sounds.   Pulmonary:      Effort: Pulmonary effort is normal.      Breath sounds: Normal  breath sounds.   Abdominal:      General: Bowel sounds are normal. There is no distension.      Palpations: Abdomen is soft. There is no mass.      Tenderness: There is no abdominal tenderness. There is no guarding.      Comments: No HSM   Musculoskeletal:      Right lower leg: No edema.      Left lower leg: No edema.   Skin:     General: Skin is warm and dry.   Neurological:      Mental Status: She is alert and oriented to person, place, and time.   Psychiatric:         Behavior: Behavior normal.          LABS     Office Visit on 10/17/2024   Component Date Value    Glucose Urine 10/17/2024 Negative     Bilirubin Urine 10/17/2024 Negative     Ketones, UA 10/17/2024 Negative     Spec Gravity 10/17/2024 >1.030     Blood Urine 10/17/2024 Negative     PH Urine 10/17/2024 5.5     Protein Urine 10/17/2024 Trace     Urobilinogen Urine 10/17/2024 0.2     Nitrite Urine 10/17/2024 Negative     Leukocyte Esterase Urine 10/17/2024 Negative     APPEARANCE 10/17/2024 clear     Color Urine 10/17/2024 dark yellow     Multistix Lot# 10/17/2024 [id number]     Multistix Expiration Date 10/17/2024 05/31/2025    Office Visit on 08/22/2024   Component Date Value    WHITE BLOOD CELL COUNT 09/24/2024 7.1     RED BLOOD CELL COUNT 09/24/2024 4.37     HEMOGLOBIN 09/24/2024 13.7     HEMATOCRIT 09/24/2024 42.2     MCV 09/24/2024 96.6     MCH 09/24/2024 31.4     MCHC 09/24/2024 32.5     RDW 09/24/2024 12.7     PLATELET COUNT 09/24/2024 273     MPV 09/24/2024 10.4     ABSOLUTE NEUTROPHILS 09/24/2024 3,628     ABSOLUTE LYMPHOCYTES 09/24/2024 2,705     ABSOLUTE MONOCYTES 09/24/2024 419     ABSOLUTE EOSINOPHILS 09/24/2024 291     ABSOLUTE BASOPHILS 09/24/2024 57     NEUTROPHILS 09/24/2024 51.1     LYMPHOCYTES 09/24/2024 38.1     MONOCYTES 09/24/2024 5.9     EOSINOPHILS 09/24/2024 4.1     BASOPHILS 09/24/2024 0.8     GLUCOSE 09/24/2024 125 (H)     UREA NITROGEN (BUN) 09/24/2024 21     CREATININE 09/24/2024 0.71     EGFR 09/24/2024 98     BUN/CREATININE  RATIO 09/24/2024 SEE NOTE:     SODIUM 09/24/2024 142     POTASSIUM 09/24/2024 4.3     CHLORIDE 09/24/2024 105     CARBON DIOXIDE 09/24/2024 26     CALCIUM 09/24/2024 9.5     PROTEIN, TOTAL 09/24/2024 7.2     ALBUMIN 09/24/2024 4.5     GLOBULIN 09/24/2024 2.7     ALBUMIN/GLOBULIN RATIO 09/24/2024 1.7     BILIRUBIN, TOTAL 09/24/2024 0.7     ALKALINE PHOSPHATASE 09/24/2024 93     AST 09/24/2024 12     ALT 09/24/2024 16     CHOLESTEROL, TOTAL 09/24/2024 103     HDL CHOLESTEROL 09/24/2024 38 (L)     TRIGLYCERIDES 09/24/2024 118     LDL-CHOLESTEROL 09/24/2024 45     CHOL/HDLC RATIO 09/24/2024 2.7     NON-HDL CHOLESTEROL 09/24/2024 65     TSH W/REFLEX TO FT4 09/24/2024 1.76     HEMOGLOBIN A1c 09/24/2024 6.9 (H)       REVIEWED THIS VISIT  ASSESSMENT/PLAN:   59 year old female with    1. Lower abdominal pain  - unclear if due to early diverticulitis vs constipation vs UTI  - UA: normal  - order STAT CT Abd/Pelvis, await results  - if sx worsen or become severe advised to fo to ED    - Urine Dip, auto without Micro  - CT ABDOMEN+PELVIS(ALL W+WO)(CPT=74178); Future    2. Need for vaccination    - INFLUENZA REFUSED [facility]      Meds This Visit:  Requested Prescriptions      No prescriptions requested or ordered in this encounter       Health Maintenance:  Health Maintenance   Topic Date Due    Zoster Vaccines (1 of 2) Never done    COVID-19 Vaccine (5 - 2023-24 season) 09/01/2024    Mammogram  11/08/2024    Influenza Vaccine (1) 06/30/2025 (Originally 10/1/2024)    Diabetes Care Foot Exam  03/21/2025    Diabetes Care Dilated Eye Exam  03/23/2025    Diabetes Care A1C  03/24/2025    Diabetes Care: Microalb/Creat Ratio  03/29/2025    Annual Physical  08/22/2025    Diabetes Care: GFR  09/24/2025    Pap Smear  10/15/2025    DTaP,Tdap,and Td Vaccines (2 - Td or Tdap) 04/04/2027    Colorectal Cancer Screening  08/10/2029    Annual Depression Screening  Completed    Pneumococcal Vaccine: Birth to 64yrs  Completed         Patient/Caregiver  Education: There are no barriers to learning. Medical education done.   Outcome: Patient verbalizes understanding and agrees with plan. Advised to call or RTC if symptoms persist or worsen.    Problem List:     Patient Active Problem List   Diagnosis    Benign essential hypertension    Class 1 obesity due to excess calories in adult    Trochanteric bursitis of both hips    Controlled type 2 diabetes mellitus without complication, without long-term current use of insulin (HCC)    Mixed hyperlipidemia    Bronchospasm    Migraine with aura    Obesity with body mass index 30 or greater    Steatosis of liver       Imaging & Referrals:  INFLUENZA REFUSED [facility]  CT ABDOMEN+PELVIS(ALL W+WO)(CPT=74178)     10/17/2024  Marcella Deng MD      Patient understands plan and follow-up.  Return for follow-up depending on lab results.              [1]   Allergies  Allergen Reactions    Latex RASH

## 2024-10-18 ENCOUNTER — HOSPITAL ENCOUNTER (OUTPATIENT)
Dept: CT IMAGING | Facility: HOSPITAL | Age: 59
Discharge: HOME | End: 2024-10-18
Attending: FAMILY MEDICINE
Payer: COMMERCIAL

## 2024-10-18 DIAGNOSIS — R10.30 LOWER ABDOMINAL PAIN: ICD-10-CM

## 2024-10-18 PROCEDURE — 74178 CT ABD&PLV WO CNTR FLWD CNTR: CPT | Performed by: FAMILY MEDICINE

## 2024-10-19 DIAGNOSIS — K57.92 ACUTE DIVERTICULITIS: Primary | ICD-10-CM

## 2024-11-09 ENCOUNTER — HOSPITAL ENCOUNTER (OUTPATIENT)
Dept: MAMMOGRAPHY | Facility: HOSPITAL | Age: 59
Discharge: HOME | End: 2024-11-09
Attending: FAMILY MEDICINE
Payer: COMMERCIAL

## 2024-11-09 DIAGNOSIS — Z12.31 ENCOUNTER FOR SCREENING MAMMOGRAM FOR MALIGNANT NEOPLASM OF BREAST: ICD-10-CM

## 2024-11-09 PROCEDURE — 77063 BREAST TOMOSYNTHESIS BI: CPT | Performed by: FAMILY MEDICINE

## 2024-11-09 PROCEDURE — 77067 SCR MAMMO BI INCL CAD: CPT | Performed by: FAMILY MEDICINE

## 2024-11-18 DIAGNOSIS — I10 BENIGN ESSENTIAL HYPERTENSION: ICD-10-CM

## 2024-11-18 RX ORDER — LOSARTAN POTASSIUM AND HYDROCHLOROTHIAZIDE 12.5; 5 MG/1; MG/1
1 TABLET ORAL DAILY
Qty: 90 TABLET | Refills: 0 | Status: SHIPPED | OUTPATIENT
Start: 2024-11-18

## 2024-11-18 NOTE — TELEPHONE ENCOUNTER
Pt requesting refill of   Requested Prescriptions     Pending Prescriptions Disp Refills    LOSARTAN-HYDROCHLOROTHIAZIDE 50-12.5 MG Oral Tab [Pharmacy Med Name: LOSARTAN/HCTZ 50/12.5MG TABLETS] 90 tablet 0     Sig: TAKE 1 TABLET BY MOUTH DAILY      Last Time Medication was Filled:  8/16/2024    Last Office Visit with Provider: 10/17/2024  Benign essential hypertension  - BP stable today  - due to check labs  - cont Losartan-hydrochlorothiazide  - d/w pt a healthy, low-sodium diet, regular exercise, and wt loss  - RTC in 6 months for f-u    No future appointments.

## 2025-02-06 DIAGNOSIS — I10 BENIGN ESSENTIAL HYPERTENSION: ICD-10-CM

## 2025-02-10 RX ORDER — LOSARTAN POTASSIUM AND HYDROCHLOROTHIAZIDE 12.5; 5 MG/1; MG/1
1 TABLET ORAL DAILY
Qty: 90 TABLET | Refills: 0 | Status: SHIPPED | OUTPATIENT
Start: 2025-02-10

## 2025-02-13 ENCOUNTER — OFFICE VISIT (OUTPATIENT)
Dept: FAMILY MEDICINE CLINIC | Facility: CLINIC | Age: 60
End: 2025-02-13
Payer: COMMERCIAL

## 2025-02-13 VITALS
BODY MASS INDEX: 28.67 KG/M2 | DIASTOLIC BLOOD PRESSURE: 82 MMHG | WEIGHT: 178.38 LBS | SYSTOLIC BLOOD PRESSURE: 134 MMHG | HEART RATE: 94 BPM | OXYGEN SATURATION: 98 % | HEIGHT: 66.18 IN | RESPIRATION RATE: 18 BRPM | TEMPERATURE: 98 F

## 2025-02-13 DIAGNOSIS — L50.9 URTICARIA: ICD-10-CM

## 2025-02-13 DIAGNOSIS — R59.0 CERVICAL LYMPHADENOPATHY: Primary | ICD-10-CM

## 2025-02-13 PROCEDURE — 99213 OFFICE O/P EST LOW 20 MIN: CPT | Performed by: FAMILY MEDICINE

## 2025-02-14 NOTE — PROGRESS NOTES
CHIEF COMPLAINT:   Chief Complaint   Patient presents with    Lump     Behind (L) ear x 1 day with pain     Eyelid Swelling     X 2 months comes and goes.          HPI:     Gala Savage is a 59 year old female presents for lump behind ear.    Lump behind ear: pt noticed last night a lump behind her left ear.She felt swelling around her ear, extending to her jaw.  She noticed same lump on the right side, but not as big as the left side.  She took Tylenol and noticed the size has decreased , feels some soreness.     Allergy: pt reports periorbital swelling a few days ago, she reports she has sensitive skin. The swelling went away with a cold compress. She did not have any tongue or lip swelling, no SOB.  She has not used any new topicals, no new lotions or soap products. She did not ingest anything out of the ordinary.              HISTORY:  Past Medical History:    Migraines      Past Surgical History:   Procedure Laterality Date     delivery only          Cholecystectomy      2015    Colonoscopy N/A 08/10/2019    Procedure: COLONOSCOPY, POSSIBLE BIOPSY, POSSIBLE POLYPECTOMY 76627;  Surgeon: Jere Raman MD;  [facility]    Needle biopsy right  2017    right breast (benign)      Family History   Problem Relation Age of Onset    Diabetes Mother     Hypertension Mother     Hypertension Father     Diabetes Father     Heart Disease Father     Hypertension Sister     Diabetes Sister     Hypertension Sister     Diabetes Sister     Hypertension Brother     Diabetes Brother     Breast Cancer Neg     Colon Cancer Neg     Ovarian Cancer Neg       Social History:   Social History     Socioeconomic History    Marital status:    Tobacco Use    Smoking status: Never    Smokeless tobacco: Never   Vaping Use    Vaping status: Never Used   Substance and Sexual Activity    Alcohol use: No    Drug use: No   Social History Narrative    Works in reception/customer service, but in the  office.     Social Drivers of Health     Food Insecurity: No Food Insecurity (2/13/2025)    NCSS - Food Insecurity     Worried About Running Out of Food in the Last Year: No     Ran Out of Food in the Last Year: No   Transportation Needs: No Transportation Needs (2/13/2025)    NCSS - Transportation     Lack of Transportation: No   Housing Stability: Not At Risk (2/13/2025)    NCSS - Housing/Utilities     Has Housing: Yes     Worried About Losing Housing: No     Unable to Get Utilities: No        Medications (Active prior to today's visit):  Current Outpatient Medications   Medication Sig Dispense Refill    LOSARTAN-HYDROCHLOROTHIAZIDE 50-12.5 MG Oral Tab TAKE 1 TABLET BY MOUTH DAILY 90 tablet 0    metFORMIN  MG Oral Tablet 24 Hr TAKE 1 TABLET(500 MG) BY MOUTH DAILY WITH BREAKFAST 90 tablet 0    atorvastatin 40 MG Oral Tab Take 1 tablet (40 mg total) by mouth daily. 90 tablet 1    albuterol 108 (90 Base) MCG/ACT Inhalation Aero Soln Inhale 2 puffs into the lungs every 4 (four) hours as needed for Wheezing. 18 g 0    fluticasone propionate 50 MCG/ACT Nasal Suspension 2 sprays by Nasal route every evening. 16 g 11    aspirin 81 MG Oral Tab EC Take 1 tablet (81 mg total) by mouth daily. 90 tablet 0       Allergies:  Allergies[1]    PSFH elements reviewed from today and agreed except as otherwise stated in HPI.  ROS:     Review of Systems   Constitutional:  Negative for appetite change, chills, fatigue and fever.   HENT:  Negative for congestion, ear pain, postnasal drip, rhinorrhea, sinus pressure, sinus pain and sore throat.    Skin:  Positive for rash.   Neurological:  Negative for light-headedness and headaches.         Pertinent positives and negatives noted in the the HPI.    PHYSICAL EXAM:   /82 (BP Location: Left arm, Patient Position: Sitting, Cuff Size: adult)   Pulse 94   Temp 97.9 °F (36.6 °C) (Temporal)   Resp 18   Ht 5' 6.18\" (1.681 m)   Wt 178 lb 6.4 oz (80.9 kg)   LMP  (LMP Unknown)    SpO2 98%   BMI 28.64 kg/m²   Vital signs reviewed.Appears stated age, well groomed.  Physical Exam  Vitals reviewed.   Constitutional:       General: She is not in acute distress.     Appearance: Normal appearance.   HENT:      Head: Normocephalic.      Right Ear: Ear canal and external ear normal. There is no impacted cerumen.      Left Ear: Ear canal and external ear normal. There is no impacted cerumen.      Ears:      Comments: TM opacity bilaterally  No erythema and not bulging.     Nose: No congestion.   Eyes:      Conjunctiva/sclera: Conjunctivae normal.   Neck:      Comments: Palpable bilateral posterior auricular LAD, mildly tender, no erythema.  Cardiovascular:      Rate and Rhythm: Normal rate and regular rhythm.      Pulses: Normal pulses.      Heart sounds: Normal heart sounds. No murmur heard.  Pulmonary:      Effort: Pulmonary effort is normal. No respiratory distress.      Breath sounds: Normal breath sounds.   Musculoskeletal:      Cervical back: Normal range of motion and neck supple.      Right lower leg: No edema.      Left lower leg: No edema.   Lymphadenopathy:      Cervical: Cervical adenopathy present.   Skin:     General: Skin is warm and dry.   Neurological:      Mental Status: She is alert and oriented to person, place, and time.   Psychiatric:         Behavior: Behavior normal.          LABS     No visits with results within 2 Month(s) from this visit.   Latest known visit with results is:   Office Visit on 10/17/2024   Component Date Value    Glucose Urine 10/17/2024 Negative     Bilirubin Urine 10/17/2024 Negative     Ketones, UA 10/17/2024 Negative     Spec Gravity 10/17/2024 >1.030     Blood Urine 10/17/2024 Negative     PH Urine 10/17/2024 5.5     Protein Urine 10/17/2024 Trace     Urobilinogen Urine 10/17/2024 0.2     Nitrite Urine 10/17/2024 Negative     Leukocyte Esterase Urine 10/17/2024 Negative     APPEARANCE 10/17/2024 clear     Color Urine 10/17/2024 dark yellow      Multistix Lot# 10/17/2024 [id number]     Multistix Expiration Date 10/17/2024 05/31/2025       REVIEWED THIS VISIT  ASSESSMENT/PLAN:   59 year old female with    1. Cervical lymphadenopathy  - suspect she has low-grade viral illness vs allergies causing reactive LAD  - supportive care d/w pt  - if sx worsen or persist after 4 wks, then advised her to return to complete her science project    2. Urticaria  - unclear etiology  of allergen, periorbital redness and swelling  - recommend PO antihistamine prn     The patient and provider have a longitudinal relationship to address/treat the serious or   complex condition(s) as stated in this encounter.       Meds This Visit:  Requested Prescriptions      No prescriptions requested or ordered in this encounter       Health Maintenance:  Health Maintenance   Topic Date Due    Zoster Vaccines (1 of 2) Never done    COVID-19 Vaccine (5 - 2024-25 season) 09/01/2024    Diabetes Care: Foot Exam (Annual)  01/01/2025    Diabetes Care: Microalb/Creat Ratio (Annual)  01/01/2025    Diabetes Care Dilated Eye Exam  03/23/2025    Influenza Vaccine (1) 06/30/2025 (Originally 10/1/2024)    Diabetes Care A1C  03/24/2025    Annual Physical  08/22/2025    Diabetes Care: GFR  09/24/2025    Pap Smear  10/15/2025    Mammogram  11/09/2025    DTaP,Tdap,and Td Vaccines (2 - Td or Tdap) 04/04/2027    Colorectal Cancer Screening  08/10/2029    Annual Depression Screening  Completed    Pneumococcal Vaccine: 50+ Years  Completed    Meningococcal B Vaccine  Aged Out         Patient/Caregiver Education: There are no barriers to learning. Medical education done.   Outcome: Patient verbalizes understanding and agrees with plan. Advised to call or RTC if symptoms persist or worsen.    Problem List:     Patient Active Problem List   Diagnosis    Benign essential hypertension    Class 1 obesity due to excess calories in adult    Trochanteric bursitis of both hips    Controlled type 2 diabetes mellitus without  complication, without long-term current use of insulin (HCC)    Mixed hyperlipidemia    Bronchospasm    Migraine with aura    Obesity with body mass index 30 or greater    Steatosis of liver       Imaging & Referrals:  None     2/14/2025  Marcella Deng MD      Patient understands plan and follow-up.  Return in about 4 weeks (around 3/13/2025) for neck swelling f-u, Diabetes, Cholesterol, medication follow-up.              [1]   Allergies  Allergen Reactions    Latex RASH

## 2025-04-01 DIAGNOSIS — E11.9 CONTROLLED TYPE 2 DIABETES MELLITUS WITHOUT COMPLICATION, WITHOUT LONG-TERM CURRENT USE OF INSULIN (HCC): ICD-10-CM

## 2025-04-02 NOTE — TELEPHONE ENCOUNTER
Refill(s) Requested:   Requested Prescriptions     Pending Prescriptions Disp Refills    METFORMIN  MG Oral Tablet 24 Hr [Pharmacy Med Name: METFORMIN ER 500MG 24HR TABS] 90 tablet 0     Sig: TAKE 1 TABLET(500 MG) BY MOUTH DAILY WITH BREAKFAST     Medication Last Prescribed:  9/20/2024 90 days 0 refills     Has dose or medication been changed    since last prescription? *Review notes*    []  Yes       [x]  No     Last office visit: 2/13/2025 (in-office)  Visit date not found (virtual visit)     Relevant details from LOV note: - diabetes is well controlled  - last A1c in 3/2024 : 6.8  - last microalbumin: 3/2024  - last diabetic foot exam: completed 3/21/24  - last diabetic eye exam: completed 1/2024, Dr. Marino at [facility] (no DR)  - cont meds: Metofrmin  mg QD  - discussed appropriate diabetic diet, regular exercise, and wt loss  - RTC in 6 months for f-u     Relevant lab results:   Lab Results   Component Value Date     10/14/2020    A1C 6.9 (H) 09/24/2024      Patient was asked to follow-up in: Return in about 6 months (around 2/22/2025) for Diabetes, HTN, Cholesterol, medication follow-up.     Appointment due: February 2025    Future Appointments: Visit date not found     Action taken:  [x] patient is overdue for follow up appointment and overdue for lab work

## 2025-04-08 RX ORDER — METFORMIN HYDROCHLORIDE 500 MG/1
500 TABLET, EXTENDED RELEASE ORAL
Qty: 30 TABLET | Refills: 0 | Status: SHIPPED | OUTPATIENT
Start: 2025-04-08

## 2025-05-07 ENCOUNTER — TELEPHONE (OUTPATIENT)
Dept: FAMILY MEDICINE CLINIC | Facility: CLINIC | Age: 60
End: 2025-05-07

## 2025-05-09 ENCOUNTER — TELEPHONE (OUTPATIENT)
Dept: FAMILY MEDICINE CLINIC | Facility: CLINIC | Age: 60
End: 2025-05-09

## 2025-05-09 DIAGNOSIS — E11.9 CONTROLLED TYPE 2 DIABETES MELLITUS WITHOUT COMPLICATION, WITHOUT LONG-TERM CURRENT USE OF INSULIN (HCC): ICD-10-CM

## 2025-05-09 RX ORDER — METFORMIN HYDROCHLORIDE 500 MG/1
500 TABLET, EXTENDED RELEASE ORAL
Qty: 30 TABLET | Refills: 0 | Status: SHIPPED | OUTPATIENT
Start: 2025-05-09

## 2025-05-09 NOTE — TELEPHONE ENCOUNTER
Ella requesting a refill on Metformin.  Prior to this call - we had not received any refill request from Ella.    Requested Prescriptions     Pending Prescriptions Disp Refills    metFORMIN  MG Oral Tablet 24 Hr 30 tablet 0     Sig: Take 1 tablet (500 mg total) by mouth daily with breakfast.     LOV 2/13/2025 - acute  LOV regarding diabetes was 8/22/24    Patient was asked to follow-up in: 6 months    Appointment scheduled: Visit date not found     Medication refilled for 30 days.   Detailed message left advising Pt to schedule an appt and needs labs

## 2025-05-09 NOTE — TELEPHONE ENCOUNTER
Ella calling they said they sent something over the beginning of the month and has not heard anything

## 2025-05-29 ENCOUNTER — TELEPHONE (OUTPATIENT)
Age: 60
End: 2025-05-29

## 2025-06-06 DIAGNOSIS — E11.9 CONTROLLED TYPE 2 DIABETES MELLITUS WITHOUT COMPLICATION, WITHOUT LONG-TERM CURRENT USE OF INSULIN (HCC): ICD-10-CM

## 2025-06-08 LAB
ALBUMIN/GLOBULIN RATIO: 2 (CALC) (ref 1–2.5)
ALBUMIN: 4.6 G/DL (ref 3.6–5.1)
ALKALINE PHOSPHATASE: 96 U/L (ref 37–153)
ALT: 12 U/L (ref 6–29)
AST: 11 U/L (ref 10–35)
BILIRUBIN, TOTAL: 0.6 MG/DL (ref 0.2–1.2)
BUN: 21 MG/DL (ref 7–25)
CALCIUM: 9.2 MG/DL (ref 8.6–10.4)
CARBON DIOXIDE: 29 MMOL/L (ref 20–32)
CHLORIDE: 104 MMOL/L (ref 98–110)
CHOL/HDLC RATIO: 2.3 (CALC)
CHOLESTEROL, TOTAL: 111 MG/DL
CREATININE, RANDOM URINE: 181 MG/DL (ref 20–275)
CREATININE: 0.63 MG/DL (ref 0.5–1.03)
EGFR: 102 ML/MIN/1.73M2
GLOBULIN: 2.3 G/DL (CALC) (ref 1.9–3.7)
GLUCOSE: 125 MG/DL (ref 65–99)
HDL CHOLESTEROL: 48 MG/DL
HEMOGLOBIN A1C: 6.9 %
LDL-CHOLESTEROL: 50 MG/DL (CALC)
MICROALBUMIN/CREATININE RATIO, RANDOM URINE: 9 MG/G CREAT
MICROALBUMIN: 1.7 MG/DL
NON-HDL CHOLESTEROL: 63 MG/DL (CALC)
POTASSIUM: 4.2 MMOL/L (ref 3.5–5.3)
PROTEIN, TOTAL: 6.9 G/DL (ref 6.1–8.1)
SODIUM: 140 MMOL/L (ref 135–146)
TRIGLYCERIDES: 57 MG/DL

## 2025-06-09 RX ORDER — METFORMIN HYDROCHLORIDE 500 MG/1
500 TABLET, EXTENDED RELEASE ORAL
Qty: 90 TABLET | Refills: 3 | Status: SHIPPED | OUTPATIENT
Start: 2025-06-09

## 2025-06-09 NOTE — TELEPHONE ENCOUNTER
Refill passed per Clinic protocol.  Requested Prescriptions   Pending Prescriptions Disp Refills    metFORMIN  MG Oral Tablet 24 Hr 30 tablet 0     Sig: Take 1 tablet (500 mg total) by mouth daily with breakfast.       Diabetes Medication Protocol Passed - 6/9/2025 10:11 AM        Passed - Last A1C < 7.5 and within past 6 months     Lab Results   Component Value Date    A1C 6.9 (H) 06/07/2025             Passed - In person appointment or virtual visit in the past 6 mos or appointment in next 3 mos     Recent Outpatient Visits              2 weeks ago Situational anxiety    [facility], [facility]    Office Visit    3 months ago Cervical lymphadenopathy    [facility] Krystle, MD    Office Visit    7 months ago Lower abdominal pain    [facility], Krystle, MD    Office Visit    9 months ago Annual physical exam    [facility], Krystle, MD    Office Visit    1 year ago Benign essential hypertension    [facility] Krystle, MD    Office Visit          Future Appointments         Provider Department Appt Notes    In 5 months Mracella Deng MD [facility] [address], [facility]                    Passed - Microalbumin procedure in past 12 months or taking ACE/ARB        Passed - EGFRCR or GFRNAA > 50     GFR Evaluation  EGFRCR: 102 , resulted on 6/7/2025          Passed - GFR in the past 12 months        Passed - Medication is active on med list

## 2025-08-18 DIAGNOSIS — E78.2 MIXED HYPERLIPIDEMIA: ICD-10-CM

## 2025-08-20 RX ORDER — ATORVASTATIN CALCIUM 40 MG/1
40 TABLET, FILM COATED ORAL DAILY
Qty: 90 TABLET | Refills: 3 | Status: SHIPPED | OUTPATIENT
Start: 2025-08-20